# Patient Record
Sex: FEMALE | Race: WHITE | NOT HISPANIC OR LATINO | Employment: OTHER | URBAN - METROPOLITAN AREA
[De-identification: names, ages, dates, MRNs, and addresses within clinical notes are randomized per-mention and may not be internally consistent; named-entity substitution may affect disease eponyms.]

---

## 2017-08-08 ENCOUNTER — TRANSCRIBE ORDERS (OUTPATIENT)
Dept: PHYSICAL THERAPY | Facility: HOSPITAL | Age: 77
End: 2017-08-08

## 2017-08-08 DIAGNOSIS — I89.0 LYMPHEDEMA: Primary | ICD-10-CM

## 2017-08-10 ENCOUNTER — HOSPITAL ENCOUNTER (OUTPATIENT)
Dept: PHYSICAL THERAPY | Facility: HOSPITAL | Age: 77
Setting detail: THERAPIES SERIES
Discharge: HOME | End: 2017-08-10

## 2017-08-10 DIAGNOSIS — I89.0 LYMPHEDEMA: Primary | ICD-10-CM

## 2017-08-10 PROCEDURE — G8988 SELF CARE GOAL STATUS: HCPCS

## 2017-08-10 PROCEDURE — G8987 SELF CARE CURRENT STATUS: HCPCS

## 2017-08-10 NOTE — THERAPY EVALUATION
Physical Therapy Lymphedema Initial Evaluation  [facility]     Patient Name: Renee Connor  : 1940  MRN: [id number]  Today's Date: 8/10/2017      Visit Date: 08/10/2017    Visit Dx:    ICD-10-CM ICD-9-CM   1. Lymphedema I89.0 457.1       There is no problem list on file for this patient.       Past Medical History:   Diagnosis Date   • Arthritis    • Hypertension     Pulmonery Hypertension   • Stroke         Past Surgical History:   Procedure Laterality Date   • APPENDECTOMY     • BACK SURGERY     • VARICOSE VEIN SURGERY         Visit Dx:    ICD-10-CM ICD-9-CM   1. Lymphedema I89.0 457.1             Patient History       08/10/17 1200          History    Chief Complaint --   Swelling in legs  -KD      Date Current Problem(s) Began 17  -KD      Brief Description of Current Complaint --   Pt has had swelling in legs for years, worse recently  -KD      Previous treatment for THIS PROBLEM --   Has tried wearing compression stockings but was unsuccessful  -KD      Onset Date- PT --   17  -KD      Patient/Caregiver Goals Decrease swelling  -KD      How has patient tried to help current problem? --   Pt applies ace wraps daily to lower legs  -KD      Pain     Pain at Present 4  -KD      Pain at Best 4  -KD      Pain at Worst 4  -KD      What position do you sleep in? --   Sleeps in recliner  -KD      Difficulties with ADL's? --   Needs asst with some home tasks,ok with personal care tasks  -KD      Fall Risk Assessment    Any falls in the past year: No  -KD      Daily Activities    Primary Language English  -KD      Are you able to read Yes  -KD      Are you able to write Yes  -KD      How does patient learn best? Listening  -KD      Teaching needs identified Home Exercise Program;Management of Condition  -KD      Does patient have problems with the following? None  -KD      Barriers to learning None  -KD      Pt Participated in POC and Goals Yes  -KD      Safety    Are you being hurt,  "hit, or frightened by anyone at home or in your life? No  -KD      Are you being neglected by a caregiver No  -KD        User Key  (r) = Recorded By, (t) = Taken By, (c) = Cosigned By    Initials Name Provider Type    LC Finch, PT Physical Therapist                Lymphedema       08/10/17 1200          Subjective Comments    Subjective Comments States she's had swelling in her legs for years, has gotten better and worse over the years, but lately getting worse. States she lives with her , son and daughter.  -KD      Lymphedema Assessment    Lymphedema Classification RLE:;LLE:;stage 2 (Spontaneously Irreversible)  -KD      Subjective Pain    Able to rate subjective pain? yes  -KD      Pre-Treatment Pain Level 4  -KD      Post-Treatment Pain Level 4  -KD      Lymphedema Edema Assessment    Edema Assessment Comment B LE mod to severe firm edema with much creasing and papillomas toes to knees.  -KD      Skin Changes/Observations    Location/Assessment Lower Extremity  -KD      Lower Extremity Conditions bilateral:;intact;dry;scaly;other (comment)   Very \"bumpy\"/uneven texture  -KD      Lymphedema Sensation    Lymphedema Sensation Reports RLE:;LLE:  -KD      Lymphedema Sensation Tests light touch  -KD      Lymphedema Light Touch RLE:;LLE:;WNL  -KD      Lymphedema Measurements    Measurement Type(s) Circumferential  -KD      Circumferential Areas Lower extremities  -KD      LLE Circumferential (cm)    Measurement Location 1 Knee (popliteal crease)  -KD      Left 1 47.5 cm  -KD      Measurement Location 2 10cm below knee  -KD      Left 2 60 cm  -KD      Measurement Location 3 20cm below knee  -KD      Left 3 62 cm  -KD      Measurement Location 4 30cm below knee  -KD      Left 4 46 cm  -KD      Measurement Location 5 Ankle  -KD      Left 5 39 cm  -KD      Measurement Location 6 Midfoot  -KD      Left 6 30.5 cm  -KD      Measurement Location 7 TOTAL  -KD      Left 7 285 cm  -KD      RLE Circumferential (cm) "    Measurement Location 1 Knee (popliteal crease)  -KD      Right 1 43.5 cm  -KD      Measurement Location 2 10cm below knee  -KD      Right 2 57 cm  -KD      Measurement Location 3 20cm below knee  -KD      Right 3 56 cm  -KD      Measurement Location 4 30cm below knee  -KD      Right 4 43.5 cm  -KD      Measurement Location 5 Ankle  -KD      Right 5 39 cm  -KD      Measurement Location 6 Midfoot  -KD      Right 6 29 cm  -KD      Measurement Location 7 TOTAL  -KD      Right 7 268 cm  -KD        User Key  (r) = Recorded By, (t) = Taken By, (c) = Cosigned By    Initials Name Provider Type    LC Finch PT Physical Therapist                  PT Ortho       08/10/17 1200    Posture/Observations    Posture/Observations Comments --   Pt amb with rolling walker, son with her  -KD    ROM (Range of Motion)    General ROM Detail --   Noted some general limitation  -KD    MMT (Manual Muscle Testing)    General MMT Assessment Detail --   Generally functional, but with some LE limitations  -KD      User Key  (r) = Recorded By, (t) = Taken By, (c) = Cosigned By    Initials Name Provider Type    LC Finch PT Physical Therapist                          Therapy Education       08/10/17 1230          Therapy Education    Education Details Reviewed condition and treatment protocol, pt/son given written info re:condition  -KD      Given Symptoms/condition management  -KD      Program New  -KD      How Provided Verbal;Written  -KD      Provided to Patient;Caregiver  -KD      Level of Understanding Verbalized  -KD        User Key  (r) = Recorded By, (t) = Taken By, (c) = Cosigned By    Initials Name Provider Type    LC Finch PT Physical Therapist                  Exercises       08/10/17 1200          Subjective Comments    Subjective Comments States she's had swelling in her legs for years, has gotten better and worse over the years, but lately getting worse. States she lives with her , son and  "daughter.  -KD      Subjective Pain    Able to rate subjective pain? yes  -KD      Pre-Treatment Pain Level 4  -KD      Post-Treatment Pain Level 4  -KD        User Key  (r) = Recorded By, (t) = Taken By, (c) = Cosigned By    Initials Name Provider Type    LC Finch, PT Physical Therapist                              PT OP Goals       08/10/17 1200       PT Short Term Goals    STG Date to Achieve 08/24/17  -KD     STG 1 Patient to demonstrate proper awareness of \"What is Lymphedema\" , \"Do's and Don'ts\", and /or Risk Reduction Practices\" for improved prevention, management, care of symptoms and ease to self-care of condition.  -KD     STG 1 Progress New  -KD     STG 2 Patient independent and compliant with self-wrapping techniques of compression bandages with assistance of caregiver as needed for improved self-management of condition.  -KD     STG 2 Progress New  -KD     STG 3 Patient will demonstrate decreased net edema of >/= 5-10cm  for decrease in edema, symptoms, decreased risk of infection and improved skin care/transition to self-care of condition.  -KD     STG 3 Progress New  -KD     Long Term Goals    LTG Date to Achieve 09/09/17  -KD     LTG 1 Patient will demonstrate decreased net edema of >/= 10-20cm for decrease in edema, symptoms, decreased risk of infection and improved skin care/transition to self-care of condition.  -KD     LTG 1 Progress New  -KD     LTG 2 Patient/family/caregivers independent and compliant with self-care techniques for self-management of condition.  -KD     LTG 2 Progress New  -KD     LTG 3   Patient independent and compliant with compression garments as indicated for self-management of condition.  -KD     LTG 3 Progress New  -KD     Time Calculation    PT Goal Re-Cert Due Date 09/09/17  -KD       User Key  (r) = Recorded By, (t) = Taken By, (c) = Cosigned By    Initials Name Provider Type    LC Finch PT Physical Therapist                PT Assessment/Plan       " 08/10/17 1236       PT Assessment    Functional Limitations Limitation in home management  -KD     Impairments Edema;Impaired lymphatic circulation;Pain;Other (comment)   Some general debilitation  -KD     Assessment Comments Pt presents to the Lymphedema Clinic today with moderate + to severe firm edema of bilateral LE's, toes to knees. Skin is dry and darkly discolored with many creases and papillomas, texture very rough/bumpy. She has some general debilitation, but is able to reach her legs and  able to perform basic self-care tasks without assist. She lives with , son, and daughter. Son states that someone will be able to help pt at home with compression needs.  -KD     Please refer to paper survey for additional self-reported information Yes  -KD     Rehab Potential Good  -KD     Patient/caregiver participated in establishment of treatment plan and goals Yes  -KD     Patient would benefit from skilled therapy intervention Yes  -KD     PT Plan    PT Frequency 5x/week  -KD     Predicted Duration of Therapy Intervention (days/wks) 4 weeks  -KD     Planned CPT's? PT EVAL MOD COMPLELITY: 57158;PT RE-EVAL: 33074;PT MANUAL THERAPY EA 15 MIN: 03655;PT SELF CARE/HOME MGMT/TRAIN EA 15: 88240  -KD     Physical Therapy Interventions (Optional Details) bandaging;manual lymphatic drainage;home exercise program;patient/family education;other (see comments)   Skin care  -KD     PT Plan Comments See pt per PT Plan.  -KD       User Key  (r) = Recorded By, (t) = Taken By, (c) = Cosigned By    Initials Name Provider Type    KD Smiley Finch PT Physical Therapist                 Time Calculation:   Start Time: 1100  Stop Time: 1155  Time Calculation (min): 55 min     Therapy Charges for Today     Code Description Service Date Service Provider Modifiers Qty    33752455818 HC PT SELFCARE CURRENT 8/10/2017 Smiley Finch, PT GP, CL 1    10955093033 HC PT SELFCARE PROJECTED 8/10/2017 Smiley Finch, PT GP, CK 1    03210 DE  PHYSICAL THERAPY EVALUATION MOD COMPLEX 30 MINS 8/10/2017 Smiley Finch, PT GP 1          PT G-Codes  PT Professional Judgement Used?: Yes  Functional Limitation: Self care  Self Care Current Status (): At least 60 percent but less than 80 percent impaired, limited or restricted  Self Care Goal Status (): At least 40 percent but less than 60 percent impaired, limited or restricted         Smiley Finch, PT  8/10/2017

## 2017-12-12 ENCOUNTER — DOCUMENTATION (OUTPATIENT)
Dept: PHYSICAL THERAPY | Facility: HOSPITAL | Age: 77
End: 2017-12-12

## 2017-12-12 DIAGNOSIS — I89.0 LYMPHEDEMA: Primary | ICD-10-CM

## 2017-12-12 NOTE — THERAPY DISCHARGE NOTE
"     Outpatient Physical Therapy Discharge Summary         Patient Name: Renee Connor  : 1940  MRN: [id number]    Today's Date: 2017    Visit Dx:    ICD-10-CM ICD-9-CM   1. Lymphedema I89.0 457.1             PT OP Goals       17 1000       PT Short Term Goals    STG Date to Achieve 17  -KD     STG 1 Patient to demonstrate proper awareness of \"What is Lymphedema\" , \"Do's and Don'ts\", and /or Risk Reduction Practices\" for improved prevention, management, care of symptoms and ease to self-care of condition.  -KD     STG 1 Progress Not Met  -KD     STG 1 Progress Comments Pt unable to return for treatment.  -KD     STG 2 Patient independent and compliant with self-wrapping techniques of compression bandages with assistance of caregiver as needed for improved self-management of condition.  -KD     STG 2 Progress Not Met  -KD     STG 2 Progress Comments   Pt unable to return for treatment.  -KD     STG 3 Patient will demonstrate decreased net edema of >/= 5-10cm  for decrease in edema, symptoms, decreased risk of infection and improved skin care/transition to self-care of condition.  -KD     STG 3 Progress Not Met  -KD     STG 3 Progress Comments Pt unable to return for treatment  -KD     Long Term Goals    LTG Date to Achieve 17  -KD     LTG 1 Patient will demonstrate decreased net edema of >/= 10-20cm for decrease in edema, symptoms, decreased risk of infection and improved skin care/transition to self-care of condition.  -KD     LTG 1 Progress Not Met  -KD     LTG 1 Progress Comments Pt unable to return for treatment.  -KD     LTG 2 Patient/family/caregivers independent and compliant with self-care techniques for self-management of condition.  -KD     LTG 2 Progress Not Met  -KD     LTG 2 Progress Comments Pt unable to return for treatment.  -KD     LTG 3   Patient independent and compliant with compression garments as indicated for self-management of condition.  -KD     LTG 3 Progress Not " Met  -LC     LTG 3 Progress Comments Pt unable to return for treatment.  -LC       User Key  (r) = Recorded By, (t) = Taken By, (c) = Cosigned By    Initials Name Provider Type    LC Finch, PT Physical Therapist          OP PT Discharge Summary  Date of Discharge: 12/12/17  Reason for Discharge: Change in medical status (Pt unable to leave her house now.)  Outcomes Achieved: Unable to tolerate or actively participate in therapy  Discharge Destination: Other (comment) (No further plans at this time.)  Discharge Instructions: None at this time.      Time Calculation:                    Smiley Finch PT  12/12/2017

## 2018-05-11 ENCOUNTER — TRANSCRIBE ORDERS (OUTPATIENT)
Dept: PHYSICAL THERAPY | Facility: HOSPITAL | Age: 78
End: 2018-05-11

## 2018-05-11 DIAGNOSIS — R60.9 EDEMA, UNSPECIFIED TYPE: Primary | ICD-10-CM

## 2018-05-23 ENCOUNTER — HOSPITAL ENCOUNTER (OUTPATIENT)
Dept: PHYSICAL THERAPY | Facility: HOSPITAL | Age: 78
Setting detail: THERAPIES SERIES
Discharge: HOME | End: 2018-05-23

## 2018-05-23 DIAGNOSIS — I89.0 LYMPHEDEMA: Primary | ICD-10-CM

## 2018-05-23 PROCEDURE — G8987 SELF CARE CURRENT STATUS: HCPCS

## 2018-05-23 PROCEDURE — G8988 SELF CARE GOAL STATUS: HCPCS

## 2018-05-23 PROCEDURE — 97163 PT EVAL HIGH COMPLEX 45 MIN: CPT

## 2018-05-23 PROCEDURE — 97140 MANUAL THERAPY 1/> REGIONS: CPT

## 2018-05-23 NOTE — THERAPY EVALUATION
Physical Therapy Lymphedema Initial Evaluation   [facility]     Patient Name: Renee Connor  : 1940  MRN: [id number]  Today's Date: 2018      Visit Date: 2018    Visit Dx:    ICD-10-CM ICD-9-CM   1. Lymphedema I89.0 457.1       There is no problem list on file for this patient.       Past Medical History:   Diagnosis Date   • Arthritis    • Hypertension     Pulmonery Hypertension   • Myasthenia gravis    • Stroke         Past Surgical History:   Procedure Laterality Date   • APPENDECTOMY     • BACK SURGERY     • VARICOSE VEIN SURGERY         Visit Dx:    ICD-10-CM ICD-9-CM   1. Lymphedema I89.0 457.1             Patient History     Row Name 18 1700             History    Chief Complaint Swelling   B legs  -PC      Date Current Problem(s) Began 17  -PC      Brief Description of Current Complaint t and son state she has had swelling at least 15 yrs but it is getting worse.  Son states she cannot tolerate tight bandages or compression stockings.  Other medical problems incl Pulmonary HTN and Myasthenia Gravis.  She has a stroke in .  -PC      Previous treatment for THIS PROBLEM --   Wrapping legs  -PC      Patient/Caregiver Goals Decrease swelling;Know what to do to help the symptoms  -PC      How has patient tried to help current problem? --   Wraps legs with ace wraps.  -PC         Pain     Pain at Present 5  -PC      Pain at Best 3  -PC      Pain at Worst 8  -PC      What position do you sleep in? --   Sleeps in recliner since   -PC      Difficulties with ADL's? Unable to do house work.  States she can shower and wrap her own legs.  -PC         Fall Risk Assessment    Any falls in the past year: No  -PC         Services    Are you currently receiving Home Health services No  -PC         Daily Activities    Primary Language English  -PC      Are you able to read Yes  -PC      Are you able to write Yes  -PC      How does patient learn best? Listening  -PC       Teaching needs identified Management of Condition  -PC      Does patient have problems with the following? None  -PC      Barriers to learning None  -PC      Functional Status mobility issues preventing performance of daily activities  -PC      Explanation of Functional Status Problem Pt recently diagnosed with Myasthenia Gravis.  Walks with walker.  Pt states she can bathe but is odorous.  -PC      Pt Participated in POC and Goals Yes  -PC         Safety    Are you being hurt, hit, or frightened by anyone at home or in your life? No  -PC      Are you being neglected by a caregiver No  -PC        User Key  (r) = Recorded By, (t) = Taken By, (c) = Cosigned By    Initials Name Provider Type    PC Lily Jacob, PT Physical Therapist                Lymphedema     Row Name 05/23/18 5790             Subjective Pain    Able to rate subjective pain? yes  -PC      Pre-Treatment Pain Level 5  -PC      Post-Treatment Pain Level 5  -PC         Subjective Comments    Subjective Comments Pt's son states they really just want to be instructed in how to bandage for home.  States they can't get her in to therapy regularly. Son states once her legs are wrapped she will usually only leave them on for an hour, and then she takes them off because they bother her.  -PC         Lymphedema Assessment    Lymphedema Classification RLE:;LLE:;stage 3 (Lymphostatic Elephantiasis)  -PC      Infections or Cellulitis? no  -PC         Lymphedema Edema Assessment    Edema Assessment Comment Severe firm edema bilateral toes to knees.   -PC         Skin Changes/Observations    Location/Assessment Lower Extremity  -PC      Lower Extremity Conditions bilateral:;intact;dry;scaly;other (comment)  -PC      Lower Extremity Color/Pigment bilateral:;red;fibrosis  -PC      Skin Observations Comment Pt walked into clinic with a rolling walker and bare feet. Skin is dark red with papillomas, deep folds, and rough texture.   -PC         Lymphedema Sensation     Lymphedema Sensation Reports RLE:;LLE:  -PC      Lymphedema Sensation Tests light touch  -PC      Lymphedema Light Touch WNL  -PC         Lymphedema Measurements    Measurement Type(s) Circumferential  -PC      Circumferential Areas Lower extremities  -PC         LLE Circumferential (cm)    Measurement Location 1 Knee (popliteal crease)  -PC      Left 1 53 cm  -PC      Measurement Location 2 10cm below knee  -PC      Left 2 65 cm  -PC      Measurement Location 3 20cm below knee  -PC      Left 3 65 cm  -PC      Measurement Location 4 30cm below knee  -PC      Left 4 53 cm  -PC      Measurement Location 5 Ankle  -PC      Left 5 42 cm  -PC      Measurement Location 6 Midfoot  -PC      Left 6 32 cm  -PC      Measurement Location 7 TOTAL  -PC      Left 7 310 cm  -PC         RLE Circumferential (cm)    Measurement Location 1 Knee (popliteal crease)  -PC      Right 1 48 cm  -PC      Measurement Location 2 10cm below knee  -PC      Right 2 64 cm  -PC      Measurement Location 3 20cm below knee  -PC      Right 3 54 cm  -PC      Measurement Location 4 30cm below knee  -PC      Right 4 39 cm  -PC      Measurement Location 5 Ankle  -PC      Right 5 42 cm  -PC      Measurement Location 6 Midfoot  -PC      Right 6 30 cm  -PC      Measurement Location 7 TOTAL  -PC      Right 7 277 cm  -PC         Compression/Skin Care    Skin Care lotion applied   Hydrophor  -PC      Wrapping Location lower extremity  -PC      Wrapping Location LE bilateral:;foot to knee  -PC      Bandaging Comments Tg9, artiflex x2, Short stretch bandages 1-8cm on both, 3-10cm on right, 4-10cm on left.  -PC        User Key  (r) = Recorded By, (t) = Taken By, (c) = Cosigned By    Initials Name Provider Type    PC Lily Jacob PT Physical Therapist                          Therapy Education  Education Details: Instructed pt and her son in bandaging.  Advised pt to wash legs and feet thoroughly at least once a day, apply lotion, and bandage legs.    Given:  "Bandaging/dressing change, Symptoms/condition management  Program: New  How Provided: Verbal, Demonstration  Provided to: Patient, Caregiver  Level of Understanding: Verbalized            Exercises     Row Name 05/23/18 1700             Subjective Comments    Subjective Comments Pt's son states they really just want to be instructed in how to bandage for home.  States they can't get her in to therapy regularly. Son states once her legs are wrapped she will usually only leave them on for an hour, and then she takes them off because they bother her.  -PC         Subjective Pain    Able to rate subjective pain? yes  -PC      Pre-Treatment Pain Level 5  -PC      Post-Treatment Pain Level 5  -PC        User Key  (r) = Recorded By, (t) = Taken By, (c) = Cosigned By    Initials Name Provider Type    PC Lily Jacob, PT Physical Therapist                              PT OP Goals     Row Name 05/23/18 1700          PT Short Term Goals    STG Date to Achieve 06/06/18  -PC     STG 1 Patient to demonstrate proper awareness of \"What is Lymphedema\" , \"Do's and Don'ts\", and /or Risk Reduction Practices\" for improved prevention, management, care of symptoms and ease to self-care of condition.  -PC     STG 1 Progress New  -PC     STG 2 Patient independent and compliant with self-wrapping techniques of compression bandages with assistance of caregiver as needed for improved self-management of condition.  -PC     STG 2 Progress New  -PC     STG 3 Patient will demonstrate decreased net edema of >/= 5-10cm  for decrease in edema, symptoms, decreased risk of infection and improved skin care/transition to self-care of condition.  -PC     STG 3 Progress New  -PC        Long Term Goals    LTG Date to Achieve 06/23/18  -PC     LTG 1 Patient will demonstrate decreased net edema of >/= 10-20cm for decrease in edema, symptoms, decreased risk of infection and improved skin care/transition to self-care of condition.  -PC     LTG 1 Progress New  " -PC     LTG 2 Patient/family/caregivers independent and compliant with self-care techniques for self-management of condition.  -PC     LTG 2 Progress New  -PC     LTG 3   Patient independent and compliant with compression garments as indicated for self-management of condition.  -PC     LTG 3 Progress New  -PC        Time Calculation    PT Goal Re-Cert Due Date 08/23/18  -PC       User Key  (r) = Recorded By, (t) = Taken By, (c) = Cosigned By    Initials Name Provider Type    PC Lliy Jacob PT Physical Therapist                PT Assessment/Plan     Row Name 05/23/18 9111          PT Assessment    Functional Limitations Limitation in home management;Performance in self-care ADL  -PC     Impairments Edema;Impaired lymphatic circulation;Pain;Integumentary integrity  -PC     Assessment Comments Pt was evaluated in the Lymphedema Clinic back in August but was unable to come in for treatment.  Pt returns today for bandaging instruction. She presents with worsening severe edema from toes to knees.  Skin is dark red and fibrotic with large papillomas and deep folds.  Pt is odorous and does not appear to be wearing clean clothes.  She states that she washes her legs 3-4 times per day.  She is unable to come in for regular treatment, but wanted to learn how to bandage her legs so she and her family could keep her legs wrapped.  She and her son were instructed in bandaging and they are to cont at home. She was advised to keep legs and feet very clean and to wear socks and shoes.  -PC     Please refer to paper survey for additional self-reported information Yes  -PC     Rehab Potential Fair  -PC     Patient/caregiver participated in establishment of treatment plan and goals Yes  -PC     Patient would benefit from skilled therapy intervention Yes  -PC        PT Plan    PT Frequency 1x/week  -PC     Predicted Duration of Therapy Intervention (OT Eval) 4 weeks  -PC     Planned CPT's? PT EVAL HIGH COMPLEXITY: 06723;PT MANUAL  THERAPY EA 15 MIN: 15200;PT SELF CARE/HOME MGMT/TRAIN EA 15: 20471  -PC     Physical Therapy Interventions (Optional Details) bandaging;patient/family education  -PC     PT Plan Comments Pt and family to bandage at home and call if having problems.  -PC       User Key  (r) = Recorded By, (t) = Taken By, (c) = Cosigned By    Initials Name Provider Type    PC Lily Jacob, PT Physical Therapist                       Time Calculation:   Start Time: 1530  Stop Time: 1630  Time Calculation (min): 60 min  Total Timed Code Minutes- PT: 30 minute(s)     Therapy Charges for Today     Code Description Service Date Service Provider Modifiers Qty    56428328310 HC PT SELFCARE CURRENT 5/23/2018 Lily Jacob, PT GP, CL 1    42522246302 HC PT SELFCARE PROJECTED 5/23/2018 Lily Jacob, PT GP, CK 1    17806749426 HC PT EVAL HIGH COMPLEXITY 2 5/23/2018 Lily Jacob, PT GP 1    43402903914 HC PT MANUAL THERAPY EA 15 MIN 5/23/2018 Lily Jacob, PT GP 2          PT G-Codes  PT Professional Judgement Used?: Yes  Functional Limitation: Self care  Self Care Current Status (): At least 60 percent but less than 80 percent impaired, limited or restricted  Self Care Goal Status (): At least 40 percent but less than 60 percent impaired, limited or restricted         Lily Jacob, PT  5/23/2018

## 2018-09-05 ENCOUNTER — DOCUMENTATION (OUTPATIENT)
Dept: PHYSICAL THERAPY | Facility: HOSPITAL | Age: 78
End: 2018-09-05

## 2018-09-05 DIAGNOSIS — I89.0 LYMPHEDEMA: Primary | ICD-10-CM

## 2018-09-05 NOTE — THERAPY DISCHARGE NOTE
"     Outpatient Physical Therapy Discharge Summary         Patient Name: Renee Connor  : 1940  MRN: [id number]    Today's Date: 2018    Visit Dx:    ICD-10-CM ICD-9-CM   1. Lymphedema I89.0 457.1             PT OP Goals     Row Name 18 1400          PT Short Term Goals    STG 1 Patient to demonstrate proper awareness of \"What is Lymphedema\" , \"Do's and Don'ts\", and /or Risk Reduction Practices\" for improved prevention, management, care of symptoms and ease to self-care of condition.  -PC     STG 1 Progress Not Met  -PC     STG 2 Patient independent and compliant with self-wrapping techniques of compression bandages with assistance of caregiver as needed for improved self-management of condition.  -PC     STG 2 Progress Not Met  -PC     STG 3 Patient will demonstrate decreased net edema of >/= 5-10cm  for decrease in edema, symptoms, decreased risk of infection and improved skin care/transition to self-care of condition.  -PC     STG 3 Progress Not Met  -PC        Long Term Goals    LTG 1 Patient will demonstrate decreased net edema of >/= 10-20cm for decrease in edema, symptoms, decreased risk of infection and improved skin care/transition to self-care of condition.  -PC     LTG 1 Progress Not Met  -PC     LTG 2 Patient/family/caregivers independent and compliant with self-care techniques for self-management of condition.  -PC     LTG 2 Progress Not Met  -PC     LTG 3   Patient independent and compliant with compression garments as indicated for self-management of condition.  -PC     LTG 3 Progress Not Met  -PC       User Key  (r) = Recorded By, (t) = Taken By, (c) = Cosigned By    Initials Name Provider Type    Lily Sena, PT Physical Therapist          OP PT Discharge Summary  Reason for Discharge: Unable to participate (It was too difficult for her family to get her back and forth to therapy.)  Outcomes Achieved: Unable to tolerate or actively participate in therapy  Discharge Destination: " Home with home program  Discharge Instructions/Additional Comments: Pt's family was instructed in bandaging for home. Instructed to keep legs and feet very clean and moisturized.      Time Calculation:        Therapy Suggested Charges     Code   Minutes Charges    None                       Lily Jacob, PT  9/5/2018

## 2018-10-30 ENCOUNTER — INPATIENT HOSPITAL (OUTPATIENT)
Dept: URBAN - METROPOLITAN AREA HOSPITAL 133 | Facility: HOSPITAL | Age: 78
End: 2018-10-30

## 2018-10-30 DIAGNOSIS — D64.9 ANEMIA, UNSPECIFIED: ICD-10-CM

## 2018-10-30 DIAGNOSIS — R63.4 ABNORMAL WEIGHT LOSS: ICD-10-CM

## 2018-10-30 DIAGNOSIS — K92.2 GASTROINTESTINAL HEMORRHAGE, UNSPECIFIED: ICD-10-CM

## 2018-10-30 DIAGNOSIS — R19.7 DIARRHEA, UNSPECIFIED: ICD-10-CM

## 2018-10-30 PROCEDURE — 99232 SBSQ HOSP IP/OBS MODERATE 35: CPT
